# Patient Record
Sex: FEMALE | Race: WHITE
[De-identification: names, ages, dates, MRNs, and addresses within clinical notes are randomized per-mention and may not be internally consistent; named-entity substitution may affect disease eponyms.]

---

## 2019-08-17 ENCOUNTER — HOSPITAL ENCOUNTER (EMERGENCY)
Dept: HOSPITAL 61 - ER | Age: 44
LOS: 1 days | Discharge: HOME | End: 2019-08-18
Payer: COMMERCIAL

## 2019-08-17 VITALS — SYSTOLIC BLOOD PRESSURE: 135 MMHG | DIASTOLIC BLOOD PRESSURE: 81 MMHG

## 2019-08-17 VITALS — WEIGHT: 115 LBS | BODY MASS INDEX: 18.05 KG/M2 | HEIGHT: 67 IN
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
## 2019-08-17 DIAGNOSIS — S50.02XA: Primary | ICD-10-CM
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
## 2019-08-17 PROCEDURE — 99284 EMERGENCY DEPT VISIT MOD MDM: CPT

## 2019-08-17 PROCEDURE — 73080 X-RAY EXAM OF ELBOW: CPT

## 2019-08-17 NOTE — PHYS DOC
Adult General


Chief Complaint


Chief Complaint:  ELBOW PROBLEM





HPI


HPI





Patient is a 44  year old male who presents to the emergency department with 

complaints of left elbow pain for the last 4 days. Patient states she was out 

running on Wednesday when she fell onto her left elbow. She states she has been 

taking ibuprofen at home for relief of the pain with no reduction in her 

symptoms. She currently rates the pain a 10 out of 10 on the pain scale. She 

also reports an abrasion to her posterior left elbow, her last tetanus shot was 

about 3 years ago.





ROS


Patient denies any fever, head, neck, or back pain. She denies any shortness of 

breath, nausea, vomiting, or abdominal pain. Patient denies any numbness or 

tingling of the affected extremity. All other ROS is neg unless otherwise noted 

in HPI.





 (MARCE MANNING)





Review of Systems


Review of Systems


See Above


 (MARCE MANNING)





Current Medications


Current Medications





Current Medications








 Medications


  (Trade)  Dose


 Ordered  Sig/Glo  Start Time


 Stop Time Status Last Admin


Dose Admin


 


 Acetaminophen/


 Hydrocodone Bitart


  (Lortab 5/325)  1 tab  1X  ONCE  8/17/19 23:30


 8/17/19 23:31 DC 8/17/19 23:12


1 TAB





 (NADYA LAMAR DO)





Allergies


Allergies





Allergies








Coded Allergies Type Severity Reaction Last Updated Verified


 


  No Known Drug Allergies    8/17/19 No





 (NADYA LAMAR DO)





Physical Exam


Physical Exam


See Above


Constitutional: Well developed, well nourished, no acute distress, non-toxic 

appearance. []


HENT: Normocephalic, atraumatic, bilateral external ears normal,  nose normal. 

[]


Eyes: PERRLA, conjunctiva normal, no discharge. [] 


Neck: Normal range of motion, no stridor. [] 


Cardiovascular:Heart rate regular rhythm


Lungs & Thorax:  Respirations even and unlabored, no retractions, no respiratory

 distress


Skin: Warm, dry, no erythema, no rash; abrasion noted to posterior left elbow[] 


Extremities: Posterior left elbow TTP, no cyanosis, no clubbing, limited ROM due

 to pain, 1+ edema. [] 


Neurologic: Alert and oriented X 3, normal motor function, normal sensory 

function, no focal deficits noted. []


Psychologic: Affect normal, judgement normal, mood normal. []


 (MARCE MANNING)





Current Patient Data


Vital Signs





                                   Vital Signs








  Date Time  Temp Pulse Resp B/P (MAP) Pulse Ox O2 Delivery O2 Flow Rate FiO2


 


8/17/19 23:12   16  98 Room Air  


 


8/17/19 22:55 98.8 99  135/81 (99)    





 98.8       





 (NADYA LAMAR DO)





EKG


EKG


[]


 (MARCE MANNING)





Radiology/Procedures


Radiology/Procedures


L elbow no acute findings, read by Dr. Lamar. 








PROCEDURE: ELBOW LEFT 3V





EXAM: LEFT ELBOW 3 VIEWS.


 


HISTORY: Left elbow pain after a fall.


 


COMPARISON: None.


 


FINDINGS: No fractures are identified. Joint spaces and alignment are 


maintained. There is no joint effusion.


 


IMPRESSION: 


1. No fracture or joint effusion.[]


 (MARCE MANNING)





Course & Med Decision Making


Course & Med Decision Making


Pertinent Labs and Imaging studies reviewed. (See chart for details)


L elbow contusion, abrasion, and pain


x-ray negative for acute findings or fracture. Pt was given one 5/325 mg 

hydrocodone in the ER. Recommend ice, elevation and compression. Follow up with 

Dr. Perkins if sx persist, return to ER if sx worsen. Rx for naproxen. 


Patient verbalized an understanding of home care, medications, follow-up, and 

return to ED instructions and was in agreement with the plan of care.


 (MARCE MANNING)





Dragon Disclaimer


Dragon Disclaimer


This electronic medical record was generated, in whole or in part, using a voice

 recognition dictation system.


 (MARCE MANNING)





Splinting


Splinting :  


   Location:  Left elbow


   Pre-Made Type:  ACE bandage


   Pre-Proc Neuro Vasc Exam:  normal


   Post-Proc Neuro Vasc Exam:  normal, unchanged from pre-exam


 (NADYA LAMAR DO)





Departure


Departure


Impression:  


   Primary Impression:  


   Left elbow contusion


   Additional Impressions:  


   Left elbow pain


   Abrasion of left elbow, initial encounter


Disposition:  01 HOME, SELF-CARE


Condition:  STABLE


Referrals:  


NO PCP (PCP)








CARSON PERKINS MD


Patient Instructions:  Elbow Contusion, Easy-to-Read





Additional Instructions:  


Fill prescription(s) and use as directed. Recommend application of ice, 

elevation, and rest of affected extremity. Wear the ace wrap that was placed for

 comfort. Follow up with Dr. Perkins if symptoms persist. Return to the ER if 

your symptoms worsen.


Scripts


Naproxen (NAPROXEN) 375 Mg Tablet


1 TAB PO BID for 10 Days, #20 TAB 0 Refills


   Prov: MARCE MANNING         8/17/19





Attending Signature


Attending Signature


I have reviewed the PA/NP's note and plan of care. I was available for 

consultation as needed during the patient's visit in the emergency department. I

 agree with the clinical impression, plan, and disposition.


 (NADYA LAMAR DO)





Problem Qualifiers








   Primary Impression:  


   Left elbow contusion


   Encounter type:  initial encounter  Qualified Codes:  S50.02XA - Contusion of

    left elbow, initial encounter








MARCE MANNING       Aug 17, 2019 23:09


NADYA LAMAR DO             Aug 18, 2019 00:23

## 2019-08-17 NOTE — RAD
EXAM: LEFT ELBOW 3 VIEWS.

 

HISTORY: Left elbow pain after a fall.

 

COMPARISON: None.

 

FINDINGS: No fractures are identified. Joint spaces and alignment are 

maintained. There is no joint effusion.

 

IMPRESSION: 

1. No fracture or joint effusion.

 

Electronically signed by: FEDERICA Elizabeth MD (8/17/2019 11:41 PM) 

[facility] [place] [facility]